# Patient Record
Sex: MALE | Race: BLACK OR AFRICAN AMERICAN | NOT HISPANIC OR LATINO | URBAN - METROPOLITAN AREA
[De-identification: names, ages, dates, MRNs, and addresses within clinical notes are randomized per-mention and may not be internally consistent; named-entity substitution may affect disease eponyms.]

---

## 2022-04-30 ENCOUNTER — TELEPHONE ENCOUNTER (OUTPATIENT)
Dept: URBAN - METROPOLITAN AREA CLINIC 121 | Facility: CLINIC | Age: 67
End: 2022-04-30

## 2022-05-01 ENCOUNTER — TELEPHONE ENCOUNTER (OUTPATIENT)
Dept: URBAN - METROPOLITAN AREA CLINIC 121 | Facility: CLINIC | Age: 67
End: 2022-05-01

## 2022-05-01 RX ORDER — LOSARTAN POTASSIUM AND HYDROCHLOROTHIAZIDE 25; 100 MG/1; MG/1
QD TABLET, FILM COATED ORAL
Status: ACTIVE | COMMUNITY
Start: 2015-08-05

## 2022-05-01 RX ORDER — ROSUVASTATIN CALCIUM 5 MG
Q HS TABLET ORAL
Status: ACTIVE | COMMUNITY
Start: 2015-08-05

## 2022-05-01 RX ORDER — AMLODIPINE BESYLATE 10 MG/1
Q AM TABLET ORAL
Status: ACTIVE | COMMUNITY
Start: 2015-08-05

## 2024-04-08 ENCOUNTER — OV EP (OUTPATIENT)
Dept: URBAN - METROPOLITAN AREA CLINIC 29 | Facility: CLINIC | Age: 69
End: 2024-04-08
Payer: COMMERCIAL

## 2024-04-08 VITALS
DIASTOLIC BLOOD PRESSURE: 83 MMHG | SYSTOLIC BLOOD PRESSURE: 140 MMHG | HEIGHT: 67 IN | BODY MASS INDEX: 30.45 KG/M2 | WEIGHT: 194 LBS | HEART RATE: 75 BPM

## 2024-04-08 DIAGNOSIS — I10 PRIMARY HYPERTENSION: ICD-10-CM

## 2024-04-08 DIAGNOSIS — Z12.11 SCREEN FOR COLON CANCER: ICD-10-CM

## 2024-04-08 PROCEDURE — 99203 OFFICE O/P NEW LOW 30 MIN: CPT | Performed by: INTERNAL MEDICINE

## 2024-04-08 RX ORDER — LOSARTAN POTASSIUM AND HYDROCHLOROTHIAZIDE 25; 100 MG/1; MG/1
QD TABLET, FILM COATED ORAL
Status: ACTIVE | COMMUNITY
Start: 2015-08-05

## 2024-04-08 RX ORDER — ROSUVASTATIN CALCIUM 5 MG
Q HS TABLET ORAL
Status: ACTIVE | COMMUNITY
Start: 2015-08-05

## 2024-04-08 RX ORDER — AMLODIPINE BESYLATE 10 MG/1
Q AM TABLET ORAL
Status: ACTIVE | COMMUNITY
Start: 2015-08-05

## 2024-04-08 RX ORDER — ONDANSETRON HYDROCHLORIDE 4 MG/1
1 TABLET TABLET, FILM COATED ORAL
Qty: 2 | Refills: 0 | OUTPATIENT
Start: 2024-04-08

## 2024-04-08 RX ORDER — SODIUM, POTASSIUM,MAG SULFATES 17.5-3.13G
177ML SOLUTION, RECONSTITUTED, ORAL ORAL
Qty: 1 KIT | Refills: 0 | OUTPATIENT
Start: 2024-04-08 | End: 2024-04-09

## 2024-04-08 NOTE — HPI-TODAY'S VISIT:
Mr. Gage  is a 68 year old man who presents today per request by Dr. Short for colon cancer screening.  He has  had a colonoscopy 10 year ago significant for polyp.  He does not have have any family history of colon cancer or polyps.  He does not currently have any GI problems.

## 2024-04-15 ENCOUNTER — LAB (OUTPATIENT)
Dept: URBAN - METROPOLITAN AREA CLINIC 29 | Facility: CLINIC | Age: 69
End: 2024-04-15

## 2024-04-18 ENCOUNTER — COLON (OUTPATIENT)
Dept: URBAN - METROPOLITAN AREA SURGERY CENTER 7 | Facility: SURGERY CENTER | Age: 69
End: 2024-04-18
Payer: COMMERCIAL

## 2024-04-18 ENCOUNTER — LAB (OUTPATIENT)
Dept: URBAN - METROPOLITAN AREA CLINIC 4 | Facility: CLINIC | Age: 69
End: 2024-04-18
Payer: COMMERCIAL

## 2024-04-18 DIAGNOSIS — K63.5 BENIGN COLON POLYP: ICD-10-CM

## 2024-04-18 DIAGNOSIS — Z86.010 ADENOMAS PERSONAL HISTORY OF COLONIC POLYPS: ICD-10-CM

## 2024-04-18 DIAGNOSIS — K63.89 OTHER SPECIFIED DISEASES OF INTESTINE: ICD-10-CM

## 2024-04-18 DIAGNOSIS — D12.5 ADENOMA OF SIGMOID COLON: ICD-10-CM

## 2024-04-18 DIAGNOSIS — D12.2 ADENOMA OF ASCENDING COLON: ICD-10-CM

## 2024-04-18 DIAGNOSIS — D12.5 BENIGN NEOPLASM OF SIGMOID COLON: ICD-10-CM

## 2024-04-18 DIAGNOSIS — D12.2 BENIGN NEOPLASM OF ASCENDING COLON: ICD-10-CM

## 2024-04-18 PROCEDURE — 88305 TISSUE EXAM BY PATHOLOGIST: CPT | Performed by: PATHOLOGY

## 2024-04-18 PROCEDURE — 45385 COLONOSCOPY W/LESION REMOVAL: CPT | Performed by: INTERNAL MEDICINE

## 2024-04-18 RX ORDER — ONDANSETRON HYDROCHLORIDE 4 MG/1
1 TABLET TABLET, FILM COATED ORAL
Qty: 2 | Refills: 0 | Status: ACTIVE | COMMUNITY
Start: 2024-04-08

## 2024-04-18 RX ORDER — ROSUVASTATIN CALCIUM 5 MG
Q HS TABLET ORAL
Status: ACTIVE | COMMUNITY
Start: 2015-08-05

## 2024-04-18 RX ORDER — LOSARTAN POTASSIUM AND HYDROCHLOROTHIAZIDE 25; 100 MG/1; MG/1
QD TABLET, FILM COATED ORAL
Status: ACTIVE | COMMUNITY
Start: 2015-08-05

## 2024-04-18 RX ORDER — AMLODIPINE BESYLATE 10 MG/1
Q AM TABLET ORAL
Status: ACTIVE | COMMUNITY
Start: 2015-08-05